# Patient Record
Sex: MALE | Race: BLACK OR AFRICAN AMERICAN | NOT HISPANIC OR LATINO | URBAN - METROPOLITAN AREA
[De-identification: names, ages, dates, MRNs, and addresses within clinical notes are randomized per-mention and may not be internally consistent; named-entity substitution may affect disease eponyms.]

---

## 2019-12-09 ENCOUNTER — OFFICE VISIT (OUTPATIENT)
Dept: FAMILY MEDICINE | Facility: CLINIC | Age: 44
End: 2019-12-09
Payer: COMMERCIAL

## 2019-12-09 VITALS
HEART RATE: 89 BPM | OXYGEN SATURATION: 100 % | WEIGHT: 223 LBS | SYSTOLIC BLOOD PRESSURE: 132 MMHG | BODY MASS INDEX: 32.93 KG/M2 | TEMPERATURE: 97.9 F | DIASTOLIC BLOOD PRESSURE: 78 MMHG

## 2019-12-09 DIAGNOSIS — K62.89 RECTAL IRRITATION: ICD-10-CM

## 2019-12-09 DIAGNOSIS — Z00.00 ROUTINE GENERAL MEDICAL EXAMINATION AT A HEALTH CARE FACILITY: Primary | ICD-10-CM

## 2019-12-09 DIAGNOSIS — N40.0 ENLARGED PROSTATE: ICD-10-CM

## 2019-12-09 DIAGNOSIS — K64.9 HEMORRHOIDS, UNSPECIFIED HEMORRHOID TYPE: ICD-10-CM

## 2019-12-09 DIAGNOSIS — R19.4 FREQUENT BOWEL MOVEMENTS: ICD-10-CM

## 2019-12-09 DIAGNOSIS — N52.8 OTHER MALE ERECTILE DYSFUNCTION: ICD-10-CM

## 2019-12-09 DIAGNOSIS — Z72.0 TOBACCO ABUSE DISORDER: ICD-10-CM

## 2019-12-09 PROCEDURE — 99213 OFFICE O/P EST LOW 20 MIN: CPT | Mod: 25 | Performed by: FAMILY MEDICINE

## 2019-12-09 PROCEDURE — 99386 PREV VISIT NEW AGE 40-64: CPT | Performed by: FAMILY MEDICINE

## 2019-12-09 NOTE — PROGRESS NOTES
3  SUBJECTIVE:   CC: Kayli Johnson is an 44 year old male who presents for preventive health visit.     Healthy Habits:    Do you get at least three servings of calcium containing foods daily (dairy, green leafy vegetables, etc.)? yes    Amount of exercise or daily activities, outside of work: 1 day(s) per week    Problems taking medications regularly not applicable    Medication side effects: No    Have you had an eye exam in the past two years? no    Do you see a dentist twice per year? no    Do you have sleep apnea, excessive snoring or daytime drowsiness?no    Concerns:   1. Prostate: weak stream, frequency       Diagnosed with enalrged prostate on physical and was to follow up  2. BM     Going too frequently    Exercise: starting to get back, job physical  Diet: Been vegan fell off and working back. Cutting meat.  EtoH: 1-2 times a week, 3 drinks  Tobacco use: About 20 yrs about1 PPD  Sexual Activity: with one partner, starting to have problem keeping an erection.  Shots: does not do  Arthralgia: Shoulders, knees possibly work related.    Family History:   Mother: Lung cancer: was smoker,  of Aneurysm   Father: Dementia, Lung Cancer    No history of colon cancer, diabetes or HTN.  No prostate or breast cancer.    Today's PHQ-2 Score:   PHQ-2 (  [employer]) 7/10/2014   Q1: Little interest or pleasure in doing things 0   Q2: Feeling down, depressed or hopeless 0   PHQ-2 Score 0       Abuse: Current or Past(Physical, Sexual or Emotional)- No  Do you feel safe in your environment? Yes      Social History     Tobacco Use     Smoking status: Current Every Day Smoker     Smokeless tobacco: Never Used   Substance Use Topics     Alcohol use: Yes     Comment: weekly     If you drink alcohol do you typically have >3 drinks per day or >7 drinks per week? Yes - AUDIT SCORE:     No flowsheet data found.                      Last PSA: No results found for: PSA    Reviewed orders with patient. Reviewed health  maintenance and updated orders accordingly - Yes  Patient Active Problem List   Diagnosis     Hematospermia     Left foot pain     Obesity     Nail dystrophy     Hypertrophy of prostate with urinary obstruction     Tobacco abuse     No past surgical history on file.    Social History     Tobacco Use     Smoking status: Current Every Day Smoker     Smokeless tobacco: Never Used   Substance Use Topics     Alcohol use: Yes     Comment: weekly     Family History   Problem Relation Age of Onset     Cancer Mother 56        lung     Cancer Father 82        lung     Diabetes Brother      Diabetes Sister      Neurologic Disorder Sister         lupus     Heart Disease Sister          Reviewed and updated as needed this visit by Provider        ROS:  CONSTITUTIONAL: NEGATIVE for fever, chills, change in weight  INTEGUMENTARY/SKIN: NEGATIVE for worrisome rashes, moles or lesions  EYES: NEGATIVE for vision changes or irritation  ENT: NEGATIVE for ear, mouth and throat problems  RESP: NEGATIVE for significant cough or SOB  CV: NEGATIVE for chest pain, palpitations or peripheral edema  GI: NEGATIVE for nausea, abdominal pain, heartburn, or change in bowel habits   male: Positive for decreased urinary stream, erectile dysfunction  MUSCULOSKELETAL: POSITIVE for arthralgias or myalgia  NEURO: NEGATIVE for weakness, dizziness or paresthesias  PSYCHIATRIC: NEGATIVE for changes in mood or affect    OBJECTIVE:   /78   Pulse 89   Temp 97.9  F (36.6  C) (Oral)   Wt 101.2 kg (223 lb)   SpO2 100%   BMI 32.93 kg/m    EXAM:  GENERAL: healthy, alert and no distress  EYES: Eyes grossly normal to inspection, PERRL and conjunctivae and sclerae normal  HENT: ear canals and TM's normal, nose and mouth without ulcers or lesions  NECK: no adenopathy and thyroid normal to palpation  RESP: lungs clear to auscultation - no rales, rhonchi or wheezes  CV: regular rate and rhythm, normal S1 S2, no S3 or S4, no murmur, click or rub, no  "peripheral edema  ABDOMEN: soft, nontender, no masses and bowel sounds normal  RECTAL: hemorrhoid at 8 o'clock, normal sphincter tone, no rectal masses, prostate moderately enlarged in size, smooth, nontender without nodules or masses  MS: no gross musculoskeletal defects noted, no edema  SKIN: no suspicious lesions or rashes  NEURO: Normal strength and tone, mentation intact and speech normal  PSYCH: mentation appears normal, affect normal/bright    Diagnostic Test Results:  Labs reviewed in Epic    ASSESSMENT/PLAN:   Kayli was seen today for prostate problem.    Diagnoses and all orders for this visit:    Routine general medical examination at a health care facility  -     Cancel: GLUCOSE  -     Lipid panel reflex to direct LDL Fasting; Future  -     **Comprehensive metabolic panel FUTURE anytime; Future  -     GLUCOSE; Future    Enlarged prostate  -     Prostate spec antigen screen FUTURE anytime; Future    Hemorrhoids, unspecified hemorrhoid type         -   Likely from frequent bowel motions.  High fiber diet. Will consider cream if persist    Tobacco abuse disorder         -      Discussed risks of smoking and strongly advised smoking cessation.      Frequent bowel movements         -     Etiology unclear, possibly habitual.    Rectal irritation          -    From frequent BMs. Good rectal hygiene    Other orders  -     INFLUENZA VACCINE IM > 6 MONTHS VALENT IIV4 [25304]        COUNSELING:  Reviewed preventive health counseling, as reflected in patient instructions       Regular exercise       Healthy diet/nutrition       Immunizations    Declined: Influenza, Pneumococcal and TDAP due to Concerns about side effects/safety             Prostate cancer screening    Estimated body mass index is 32.93 kg/m  as calculated from the following:    Height as of 7/10/14: 1.753 m (5' 9\").    Weight as of this encounter: 101.2 kg (223 lb).    Weight management plan: Discussed healthy diet and exercise guidelines     " reports that he has been smoking. He has never used smokeless tobacco.  Tobacco Cessation Action Plan: Information offered: Patient not interested at this time    Counseling Resources:  ATP IV Guidelines  Pooled Cohorts Equation Calculator  FRAX Risk Assessment  ICSI Preventive Guidelines  Dietary Guidelines for Americans, 2010  USDA's MyPlate  ASA Prophylaxis  Lung CA Screening    Ld Cavanaugh MD  [facility]

## 2019-12-12 ENCOUNTER — TELEPHONE (OUTPATIENT)
Dept: FAMILY MEDICINE | Facility: CLINIC | Age: 44
End: 2019-12-12

## 2019-12-12 DIAGNOSIS — Z00.00 ROUTINE GENERAL MEDICAL EXAMINATION AT A HEALTH CARE FACILITY: ICD-10-CM

## 2019-12-12 DIAGNOSIS — N40.0 ENLARGED PROSTATE: ICD-10-CM

## 2019-12-12 DIAGNOSIS — N52.9 ERECTILE DYSFUNCTION, UNSPECIFIED ERECTILE DYSFUNCTION TYPE: Primary | ICD-10-CM

## 2019-12-12 LAB
ALBUMIN SERPL-MCNC: 3.9 G/DL (ref 3.4–5)
ALP SERPL-CCNC: 100 U/L (ref 40–150)
ALT SERPL W P-5'-P-CCNC: 74 U/L (ref 0–70)
ANION GAP SERPL CALCULATED.3IONS-SCNC: 2 MMOL/L (ref 3–14)
AST SERPL W P-5'-P-CCNC: 83 U/L (ref 0–45)
BILIRUB SERPL-MCNC: 0.4 MG/DL (ref 0.2–1.3)
BUN SERPL-MCNC: 17 MG/DL (ref 7–30)
CALCIUM SERPL-MCNC: 9.5 MG/DL (ref 8.5–10.1)
CHLORIDE SERPL-SCNC: 108 MMOL/L (ref 94–109)
CHOLEST SERPL-MCNC: 175 MG/DL
CO2 SERPL-SCNC: 30 MMOL/L (ref 20–32)
CREAT SERPL-MCNC: 1.04 MG/DL (ref 0.66–1.25)
GFR SERPL CREATININE-BSD FRML MDRD: 85 ML/MIN/{1.73_M2}
GLUCOSE SERPL-MCNC: 90 MG/DL (ref 70–99)
HDLC SERPL-MCNC: 69 MG/DL
LDLC SERPL CALC-MCNC: 91 MG/DL
NONHDLC SERPL-MCNC: 106 MG/DL
POTASSIUM SERPL-SCNC: 4.5 MMOL/L (ref 3.4–5.3)
PROT SERPL-MCNC: 7.1 G/DL (ref 6.8–8.8)
PSA SERPL-ACNC: 0.58 UG/L (ref 0–4)
SODIUM SERPL-SCNC: 140 MMOL/L (ref 133–144)
TRIGL SERPL-MCNC: 74 MG/DL

## 2019-12-12 PROCEDURE — 36415 COLL VENOUS BLD VENIPUNCTURE: CPT | Performed by: FAMILY MEDICINE

## 2019-12-12 PROCEDURE — 80061 LIPID PANEL: CPT | Performed by: FAMILY MEDICINE

## 2019-12-12 PROCEDURE — G0103 PSA SCREENING: HCPCS | Performed by: FAMILY MEDICINE

## 2019-12-12 PROCEDURE — 80053 COMPREHEN METABOLIC PANEL: CPT | Performed by: FAMILY MEDICINE

## 2019-12-12 NOTE — TELEPHONE ENCOUNTER
Reason for Call: Request for an order or referral:    Order or referral being requested: Lab for testosterone levels    Date needed: as soon as possible    Has the patient been seen by the PCP for this problem? YES    Additional comments: NA    Phone number Patient can be reached at:  Home number on file [phone] (home)    Best Time:  any    Can we leave a detailed message on this number?  YES    Call taken on 12/12/2019 at 7:46 AM by Pamela Garza

## 2019-12-12 NOTE — TELEPHONE ENCOUNTER
Called patient and left detailed VM informing patient of below message. Please help schedule lab only appointment if patient returns call.  Regine JOHNSON CMA ([facility])

## 2019-12-12 NOTE — TELEPHONE ENCOUNTER
Routing to provider to advise. Note that pt had labs done today. Not sure if lab can be added onto labs: Lipids, CMP and PSA.    Regine Cherry RN  [facility]

## 2019-12-12 NOTE — TELEPHONE ENCOUNTER
Lab is unable to add on. Pt will need to return for a redraw. Please notify pt, will need lab only appt. Thanks.    Regine Cherry RN  [facility]

## 2019-12-17 DIAGNOSIS — N52.9 ERECTILE DYSFUNCTION, UNSPECIFIED ERECTILE DYSFUNCTION TYPE: ICD-10-CM

## 2019-12-17 PROCEDURE — 36415 COLL VENOUS BLD VENIPUNCTURE: CPT | Performed by: FAMILY MEDICINE

## 2019-12-17 PROCEDURE — 84403 ASSAY OF TOTAL TESTOSTERONE: CPT | Performed by: FAMILY MEDICINE

## 2019-12-17 PROCEDURE — 84270 ASSAY OF SEX HORMONE GLOBUL: CPT | Performed by: FAMILY MEDICINE

## 2019-12-19 LAB
SHBG SERPL-SCNC: 50 NMOL/L (ref 11–80)
TESTOST FREE SERPL-MCNC: 10.49 NG/DL (ref 4.7–24.4)
TESTOST SERPL-MCNC: 631 NG/DL (ref 240–950)

## 2019-12-19 RX ORDER — SILDENAFIL 100 MG/1
50-100 TABLET, FILM COATED ORAL DAILY PRN
Qty: 12 TABLET | Refills: 3 | Status: SHIPPED | OUTPATIENT
Start: 2019-12-19 | End: 2022-02-22

## 2021-05-07 ENCOUNTER — VIRTUAL VISIT (OUTPATIENT)
Dept: URGENT CARE | Facility: CLINIC | Age: 46
End: 2021-05-07
Payer: COMMERCIAL

## 2021-05-07 DIAGNOSIS — Z20.822 EXPOSURE TO COVID-19 VIRUS: ICD-10-CM

## 2021-05-07 DIAGNOSIS — Z20.822 EXPOSURE TO COVID-19 VIRUS: Primary | ICD-10-CM

## 2021-05-07 LAB
SARS-COV-2 RNA RESP QL NAA+PROBE: NORMAL
SPECIMEN SOURCE: NORMAL

## 2021-05-07 PROCEDURE — 99212 OFFICE O/P EST SF 10 MIN: CPT | Mod: 95

## 2021-05-07 PROCEDURE — U0005 INFEC AGEN DETEC AMPLI PROBE: HCPCS | Performed by: NURSE PRACTITIONER

## 2021-05-07 PROCEDURE — U0003 INFECTIOUS AGENT DETECTION BY NUCLEIC ACID (DNA OR RNA); SEVERE ACUTE RESPIRATORY SYNDROME CORONAVIRUS 2 (SARS-COV-2) (CORONAVIRUS DISEASE [COVID-19]), AMPLIFIED PROBE TECHNIQUE, MAKING USE OF HIGH THROUGHPUT TECHNOLOGIES AS DESCRIBED BY CMS-2020-01-R: HCPCS | Performed by: NURSE PRACTITIONER

## 2021-05-07 NOTE — PATIENT INSTRUCTIONS
"  Patient Education   After Your COVID-19 (Coronavirus) Test  You have been tested for COVID-19 (coronavirus).   If you'll have surgery in the next few days, we'll let you know ahead of time if you have the virus. Please call your surgeon's office with any questions.  For all other patients: Results are usually available in [employer] within 2 to 3 days.   If you do not have a [employer] account, you'll get a letter in the mail in about 7 to 10 days.   [employer] is often the fastest way to get test results. Please sign up if you do not already have a [employer] account. See the handout Getting COVID-19 Test Results in [employer] for help.  What if my test result is positive?  If your test is positive and you have not viewed your result in e[employer], you'll get a phone call with your result. (A positive test means that you have the virus.)     Follow the tips under \"How do I self-isolate?\" below for 10 days (20 days if you have a weak immune system).    You don't need to be retested for COVID-19 before going back to school or work. As long as you're fever-free and feeling better, you can go back to school, work and other activities after waiting the 10 or 20 days.  What if I have questions after I get my results?  If you have questions about your results, please visit our testing website at [email or web address].   After 7 to 10 days, if you have not gotten your results:     Call [phone] ([phone]) and ask to speak with our COVID-19 results team.    If you're being treated at an infusion center: Call your infusion center directly.  What are the symptoms of COVID-19?  Cough, fever and trouble breathing are the most common signs of COVID-19.  Other symptoms can include new headaches, new muscle or body aches, new and unexplained fatigue (feeling very tired), chills, sore throat, congestion (stuffy or runny nose), diarrhea (loose poop), loss of taste or smell, belly pain, and nausea or vomiting " "(feeling sick to your stomach or throwing up).  You may already have symptoms of COVID-19, or they may show up later.  What should I do if I have symptoms?  If you're having surgery: Call your surgeon's office.  For all other patients: Stay home and away from others (self-isolate) until ...    You've had no fever--and no medicine that reduces fever--for 1 full day (24 hours), AND    Other symptoms have gotten better. For example, your cough or breathing has improved, AND    At least 10 days have passed since your symptoms first started.  How do I self-isolate?    Stay in your own room, even for meals. Use your own bathroom if you can.    Stay away from others in your home. No hugging, kissing or shaking hands. No visitors.    Don't go to work, school or anywhere else.    Clean \"high touch\" surfaces often (doorknobs, counters, handles). Use household cleaning spray or wipes. You'll find a full list of  on the EPA website: www.epa.gov/pesticide-registration/list-n-disinfectants-use-against-sars-cov-2.    Cover your mouth and nose with a mask or other face covering to avoid spreading germs.    Wash your hands and face often. Use soap and water.    Caregivers in these groups are at risk for severe illness due to COVID-19:  ? People 65 years and older  ? People who live in a nursing home or long-term care facility  ? People with chronic disease (lung, heart, cancer, diabetes, kidney, liver, immunologic)  ? People who have a weakened immune system, including those who:    Are in cancer treatment    Take medicine that weakens the immune system, such as corticosteroids    Had a bone marrow or organ transplant    Have an immune deficiency    Have poorly controlled HIV or AIDS    Are obese (body mass index of 40 or higher)    Smoke regularly    Caregivers should wear gloves while washing dishes, handling laundry and cleaning bedrooms and bathrooms.    Use caution when washing and drying laundry: Don't shake dirty " laundry and use the warmest water setting that you can.    For more tips on managing your health at home, go to www.cdc.gov/coronavirus/2019-ncov/downloads/10Things.pdf.  How can I take care of myself at home?  1. Get lots of rest. Drink extra fluids (unless a doctor has told you not to).  2. Take Tylenol (acetaminophen) for fever or pain. If you have liver or kidney problems, ask your family doctor if it's OK to take Tylenol.   Adults can take either:  ? 650 mg (two 325 mg pills) every 4 to 6 hours, or   ? 1,000 mg (two 500 mg pills) every 8 hours as needed.  ? Note: Don't take more than 3,000 mg in one day. Acetaminophen is found in many medicines (both prescribed and over-the-counter medicines). Read all labels to be sure you don't take too much.   For children, check the Tylenol bottle for the right dose. The dose is based on the child's age or weight.  3. If you have other health problems (like cancer, heart failure, an organ transplant or severe kidney disease): Call your specialty clinic if you don't feel better in the next 2 days.  4. Know when to call 911. Emergency warning signs include:  ? Trouble breathing or shortness of breath  ? Chest pain or pressure that doesn't go away  ? Feeling confused like you haven't felt before, or not being able to wake up  ? Bluish-colored lips or face  5. If your doctor prescribed a blood thinner medicine: Follow their instructions.  Where can I get more information?    [facility] - About COVID-19:   [email or web address]    CDC - If You're Sick: cdc.gov/coronavirus/2019-ncov/about/steps-when-sick.html    CDC - Ending Home Isolation: www.cdc.gov/coronavirus/2019-ncov/hcp/disposition-in-home-patients.html    CDC - Caring for Someone: www.cdc.gov/coronavirus/2019-ncov/if-you-are-sick/care-for-someone.html    [facility] - Interim Guidance for Hospital Discharge to Home: www.health.[facility].[place]/diseases/coronavirus/hcp/hospdischarge.pdf    [facility]  "clinical trials (COVID-19 research studies): clinicalaffairs.[facility]-clinical-trials    Below are the COVID-19 hotlines at the [place] Department of Health ([facility]). Interpreters are available.  ? For health questions: Call 621-917-7140 or 1-835.491.5920 (7 a.m. to 7 p.m.)  ? For questions about schools and childcare: Call 456-338-9690 or 1-930.767.3708 (7 a.m. to 7 p.m.)    For informational purposes only. Not to replace the advice of your health care provider. Clinically reviewed by Infection Prevention and the [facility] COVID-19 Clinical Team. Copyright   2020 [employer]. All rights reserved. [employer] 355325 - Rev 11/11/20.       Patient Education   After Your COVID-19 (Coronavirus) Test  You have been tested for COVID-19 (coronavirus).   If you'll have surgery in the next few days, we'll let you know ahead of time if you have the virus. Please call your surgeon's office with any questions.  For all other patients: Results are usually available in [employer] within 2 to 3 days.   If you do not have a [employer] account, you'll get a letter in the mail in about 7 to 10 days.   [employer] is often the fastest way to get test results. Please sign up if you do not already have a [employer] account. See the handout Getting COVID-19 Test Results in [employer] for help.  What if my test result is positive?  If your test is positive and you have not viewed your result in [employer], you'll get a phone call with your result. (A positive test means that you have the virus.)     Follow the tips under \"How do I self-isolate?\" below for 10 days (20 days if you have a weak immune system).    You don't need to be retested for COVID-19 before going back to school or work. As long as you're fever-free and feeling better, you can go back to school, work and other activities after waiting the 10 or 20 days.  What if I have questions after I get my results?  If you have questions about your results, please visit our testing " "website at [email or web address].   After 7 to 10 days, if you have not gotten your results:     Call [phone] ([phone]) and ask to speak with our COVID-19 results team.    If you're being treated at an infusion center: Call your infusion center directly.  What are the symptoms of COVID-19?  Cough, fever and trouble breathing are the most common signs of COVID-19.  Other symptoms can include new headaches, new muscle or body aches, new and unexplained fatigue (feeling very tired), chills, sore throat, congestion (stuffy or runny nose), diarrhea (loose poop), loss of taste or smell, belly pain, and nausea or vomiting (feeling sick to your stomach or throwing up).  You may already have symptoms of COVID-19, or they may show up later.  What should I do if I have symptoms?  If you're having surgery: Call your surgeon's office.  For all other patients: Stay home and away from others (self-isolate) until ...    You've had no fever--and no medicine that reduces fever--for 1 full day (24 hours), AND    Other symptoms have gotten better. For example, your cough or breathing has improved, AND    At least 10 days have passed since your symptoms first started.  How do I self-isolate?    Stay in your own room, even for meals. Use your own bathroom if you can.    Stay away from others in your home. No hugging, kissing or shaking hands. No visitors.    Don't go to work, school or anywhere else.    Clean \"high touch\" surfaces often (doorknobs, counters, handles). Use household cleaning spray or wipes. You'll find a full list of  on the EPA website: www.epa.gov/pesticide-registration/list-n-disinfectants-use-against-sars-cov-2.    Cover your mouth and nose with a mask or other face covering to avoid spreading germs.    Wash your hands and face often. Use soap and water.    Caregivers in these groups are at risk for severe illness due to COVID-19:  ? People 65 years and " older  ? People who live in a nursing home or long-term care facility  ? People with chronic disease (lung, heart, cancer, diabetes, kidney, liver, immunologic)  ? People who have a weakened immune system, including those who:    Are in cancer treatment    Take medicine that weakens the immune system, such as corticosteroids    Had a bone marrow or organ transplant    Have an immune deficiency    Have poorly controlled HIV or AIDS    Are obese (body mass index of 40 or higher)    Smoke regularly    Caregivers should wear gloves while washing dishes, handling laundry and cleaning bedrooms and bathrooms.    Use caution when washing and drying laundry: Don't shake dirty laundry and use the warmest water setting that you can.    For more tips on managing your health at home, go to www.cdc.gov/coronavirus/2019-ncov/downloads/10Things.pdf.  How can I take care of myself at home?  6. Get lots of rest. Drink extra fluids (unless a doctor has told you not to).  7. Take Tylenol (acetaminophen) for fever or pain. If you have liver or kidney problems, ask your family doctor if it's OK to take Tylenol.   Adults can take either:  ? 650 mg (two 325 mg pills) every 4 to 6 hours, or   ? 1,000 mg (two 500 mg pills) every 8 hours as needed.  ? Note: Don't take more than 3,000 mg in one day. Acetaminophen is found in many medicines (both prescribed and over-the-counter medicines). Read all labels to be sure you don't take too much.   For children, check the Tylenol bottle for the right dose. The dose is based on the child's age or weight.  8. If you have other health problems (like cancer, heart failure, an organ transplant or severe kidney disease): Call your specialty clinic if you don't feel better in the next 2 days.  9. Know when to call 911. Emergency warning signs include:  ? Trouble breathing or shortness of breath  ? Chest pain or pressure that doesn't go away  ? Feeling confused like you haven't felt before, or not being able  to wake up  ? Bluish-colored lips or face  10. If your doctor prescribed a blood thinner medicine: Follow their instructions.  Where can I get more information?    [facility] - About COVID-19:   [email or web address]    CDC - If You're Sick: cdc.gov/coronavirus/2019-ncov/about/steps-when-sick.html    CDC - Ending Home Isolation: www.cdc.gov/coronavirus/2019-ncov/hcp/disposition-in-home-patients.html    [facility] - Caring for Someone: www.cdc.gov/coronavirus/2019-ncov/if-you-are-sick/care-for-someone.html    [facility] - Interim Guidance for Hospital Discharge to Home: www.[facility].[place]/diseases/coronavirus/hcp/hospdischarge.pdf    [facility] clinical trials (COVID-19 research studies): clinicalaffairs.[facility]-clinical-trials    Below are the COVID-19 hotlines at the [place] Department of Health ([facility]). Interpreters are available.  ? For health questions: Call 978-076-7728 or 1-453.433.6153 (7 a.m. to 7 p.m.)  ? For questions about schools and childcare: Call 971-617-8853 or 1-540.104.3535 (7 a.m. to 7 p.m.)    For informational purposes only. Not to replace the advice of your health care provider. Clinically reviewed by Infection Prevention and the [facility] COVID-19 Clinical Team. Copyright   2020 [employer]. All rights reserved. "[employer] 049623 - Rev 11/11/20.

## 2021-05-07 NOTE — PROGRESS NOTES
Kayli is a 46 year old who is being evaluated via a billable telephone visit.        Assessment & Plan   Problem List Items Addressed This Visit     None      Visit Diagnoses     Exposure to COVID-19 virus    -  Primary    Relevant Orders    Asymptomatic COVID-19 Virus (Coronavirus) by PCR           COVID test ordered for asymptomatic exposure to COVID, scheduling number given to patient to schedule curbside COVID test. Questions answered.     Yolande Toribio NP  [facility]          Subjective   Kayli is a 46 year old who presents for the following health issues     Patient's 17-year-old daughter tested positive on 4/30/21 and he lives with her and her symptoms started on 4/27. He is asymptomatic but would like testing for COVID. He is immunocompetent. Denies cough, fever, shortness of breath or any other symptoms.         Objective       Vitals:  No vitals were obtained today due to virtual visit.    Physical Exam   alert and no distress  PSYCH: Alert and oriented times 3; coherent speech, normal   rate and volume, able to articulate logical thoughts, able   to abstract reason, no tangential thoughts, no hallucinations   or delusions  His affect is normal  RESP: No cough, no audible wheezing, able to talk in full sentences  Remainder of exam unable to be completed due to telephone visits      Phone call duration: 11 minutes

## 2021-05-08 LAB
LABORATORY COMMENT REPORT: NORMAL
SARS-COV-2 RNA RESP QL NAA+PROBE: NEGATIVE
SPECIMEN SOURCE: NORMAL

## 2021-06-06 ENCOUNTER — HEALTH MAINTENANCE LETTER (OUTPATIENT)
Age: 46
End: 2021-06-06

## 2021-07-29 ENCOUNTER — VIRTUAL VISIT (OUTPATIENT)
Dept: FAMILY MEDICINE | Facility: CLINIC | Age: 46
End: 2021-07-29
Payer: COMMERCIAL

## 2021-07-29 DIAGNOSIS — Z53.9 ERRONEOUS ENCOUNTER--DISREGARD: Primary | ICD-10-CM

## 2021-07-29 NOTE — PROGRESS NOTES
Kayli is a 46 year old who is being evaluated via a billable telephone visit.      What phone number would you like to be contacted at? [phone]  How would you like to obtain your AVS? Lizett    Called pt, he decided to to have this appt      This encounter was opened in error. Please disregard.

## 2021-09-26 ENCOUNTER — HEALTH MAINTENANCE LETTER (OUTPATIENT)
Age: 46
End: 2021-09-26

## 2022-02-21 NOTE — PROGRESS NOTES
Assessment & Plan       Chronic pain of both knees  I recommended we check x-rays of the knees.  I personally reviewed the images which show some mild degenerative changes, but no fractures.  The formal radiology report is pending.  He would likely benefit from physical therapy and weight loss.  He may need an MRI to evaluate symptoms further since he has some catching and locking symptoms, especially in the left.  - Physical Therapy Referral; Future  - XR Knee Standing Bilateral 3 Views; Future    Chronic pain of both shoulders  I recommended we check x-rays of the shoulders.  There is some evidence of degenerative changes bilaterally.  He would likely benefit from physical therapy.  If this does not help resolve symptoms he will let me know and we can pursue further treatment options.  - Physical Therapy Referral; Future  - XR Shoulder Right G/E 3 Views; Future  - XR Shoulder Left G/E 3 Views; Future    Tobacco abuse  Smoking cessation and I encouraged him to quit.  He was given a prescription for nicotine gum which has been helpful for him in the past.  - nicotine (NICORETTE) 2 MG gum; Place 1 each (2 mg) inside cheek every hour as needed for smoking cessation    Class 2 obesity due to excess calories without serious comorbidity with body mass index (BMI) of 36.0 to 36.9 in adult  He is going to work on lifestyle modifications help with weight loss.    Elevated liver enzymes  In 2019 his liver enzymes were elevated.  We are going to recheck a CMP and I added an acute hepatitis panel.  He reports being diagnosed with hepatitis A when he was young.  - Comprehensive metabolic panel (BMP + Alb, Alk Phos, ALT, AST, Total. Bili, TP); Future  - Acute hepatitis panel; Future  - Comprehensive metabolic panel (BMP + Alb, Alk Phos, ALT, AST, Total. Bili, TP)  - Acute hepatitis panel    Erectile dysfunction, unspecified erectile dysfunction type  Testosterone levels were normal 2019.  He was given a refill of Viagra.  -  "sildenafil (VIAGRA) 100 MG tablet; Take 0.5-1 tablets ( mg) by mouth daily as needed (ED)    Benign prostatic hyperplasia with weak urinary stream  He may monitor fluids in the evening to prevent nocturia symptoms.    Screening for colon cancer  - Adult Gastro Ref - Procedure Only; Future    Screening cholesterol level  - Lipid Profile (Chol, Trig, HDL, LDL calc); Future  - Lipid Profile (Chol, Trig, HDL, LDL calc)    Screening for prostate cancer  - PSA, screen; Future  - PSA, screen      40 minutes spent on the date of the encounter doing chart review, review of test results, interpretation of tests, patient visit and documentation            BMI:   Estimated body mass index is 36.58 kg/m  as calculated from the following:    Height as of this encounter: 1.753 m (5' 9\").    Weight as of this encounter: 112.4 kg (247 lb 11.2 oz).   Weight management plan: Discussed healthy diet and exercise guidelines        Return in about 1 year (around 2/22/2023) for Annual Exam.    Seymour Martínez DO  [facility]    Asa Milan is a 46 year old who presents for the following health issues     History of Present Illness     Reason for visit:  Knee and shoulder pain  Symptom onset:  More than a month  Symptom intensity:  Moderate  Symptom progression:  Staying the same  Had these symptoms before:  Yes  Has tried/received treatment for these symptoms:  No  What makes it worse:  Twisting knee, sleeping with the shoulders  What makes it better:  Rest    He eats 2-3 servings of fruits and vegetables daily.He consumes 1 sweetened beverage(s) daily.He exercises with enough effort to increase his heart rate 9 or less minutes per day.  He exercises with enough effort to increase his heart rate 3 or less days per week.   He is taking medications regularly.       Pain History:  When did you first notice your pain? Shoulder its been years. Knee for the last year  Have you seen anyone else for your " "pain? No  Where in your body do you have pain? Musculoskeletal problem/pain  Onset/Duration: Couple years  Description  Location: knee and Shoulder - bilateral  Joint Swelling: YES right knee  Redness: YES  Pain: YES for the shoulder when sleeping  Warmth: no  Intensity:  moderate  Progression of Symptoms:  worsening  Accompanying signs and symptoms:   Fevers: no  Numbness/tingling/weakness: no  History  Trauma to the area: no  Recent illness:  no  Previous similar problem: no  Previous evaluation:  no  Precipitating or alleviating factors:  Aggravating factors include: climbing stairs, lifting and overuse  Therapies tried and outcome: nothing          Review of Systems         Objective    /89   Pulse 96   Temp 97.1  F (36.2  C)   Ht 1.753 m (5' 9\")   Wt 112.4 kg (247 lb 11.2 oz)   SpO2 100%   BMI 36.58 kg/m    Body mass index is 36.58 kg/m .  Physical Exam   GENERAL: healthy, alert and no distress  EYES: Eyes grossly normal to inspection, PERRL and conjunctivae and sclerae normal  HENT: ear canals and TM's normal, nose and mouth without ulcers or lesions  NECK: no adenopathy, no asymmetry, masses, or scars and thyroid normal to palpation  RESP: lungs clear to auscultation - no rales, rhonchi or wheezes  CV: regular rate and rhythm, normal S1 S2, no S3 or S4, no murmur, click or rub, no peripheral edema and peripheral pulses strong  ABDOMEN: soft, nontender, no hepatosplenomegaly, no masses and bowel sounds normal  MS: There is some mild crepitation with flexion extension and extension of both knees.  Rahul's, varus, valgus, Lachman's and drawer testing negative bilaterally.  There is decreased active and passive range of motion of both shoulders, worse on the right.  Scaption strength is 5 out of 5 bilaterally.  Internal and external rotation strength is normal bilaterally.  Varghese testing negative bilaterally.  SKIN: no suspicious lesions or rashes  NEURO: Normal strength and tone, mentation " intact and speech normal  PSYCH: mentation appears normal, affect normal/bright

## 2022-02-22 ENCOUNTER — OFFICE VISIT (OUTPATIENT)
Dept: FAMILY MEDICINE | Facility: CLINIC | Age: 47
End: 2022-02-22
Payer: COMMERCIAL

## 2022-02-22 ENCOUNTER — ANCILLARY PROCEDURE (OUTPATIENT)
Dept: GENERAL RADIOLOGY | Facility: CLINIC | Age: 47
End: 2022-02-22
Attending: FAMILY MEDICINE
Payer: COMMERCIAL

## 2022-02-22 ENCOUNTER — TELEPHONE (OUTPATIENT)
Dept: FAMILY MEDICINE | Facility: CLINIC | Age: 47
End: 2022-02-22

## 2022-02-22 VITALS
DIASTOLIC BLOOD PRESSURE: 89 MMHG | HEART RATE: 96 BPM | SYSTOLIC BLOOD PRESSURE: 137 MMHG | WEIGHT: 247.7 LBS | HEIGHT: 69 IN | OXYGEN SATURATION: 100 % | BODY MASS INDEX: 36.69 KG/M2 | TEMPERATURE: 97.1 F

## 2022-02-22 DIAGNOSIS — G89.29 CHRONIC PAIN OF BOTH SHOULDERS: ICD-10-CM

## 2022-02-22 DIAGNOSIS — M25.511 CHRONIC PAIN OF BOTH SHOULDERS: ICD-10-CM

## 2022-02-22 DIAGNOSIS — M25.512 CHRONIC PAIN OF BOTH SHOULDERS: ICD-10-CM

## 2022-02-22 DIAGNOSIS — E66.09 CLASS 2 OBESITY DUE TO EXCESS CALORIES WITHOUT SERIOUS COMORBIDITY WITH BODY MASS INDEX (BMI) OF 36.0 TO 36.9 IN ADULT: ICD-10-CM

## 2022-02-22 DIAGNOSIS — R39.12 BENIGN PROSTATIC HYPERPLASIA WITH WEAK URINARY STREAM: ICD-10-CM

## 2022-02-22 DIAGNOSIS — N40.1 BENIGN PROSTATIC HYPERPLASIA WITH WEAK URINARY STREAM: ICD-10-CM

## 2022-02-22 DIAGNOSIS — M25.561 CHRONIC PAIN OF BOTH KNEES: ICD-10-CM

## 2022-02-22 DIAGNOSIS — Z13.220 SCREENING CHOLESTEROL LEVEL: ICD-10-CM

## 2022-02-22 DIAGNOSIS — M25.562 CHRONIC PAIN OF BOTH KNEES: ICD-10-CM

## 2022-02-22 DIAGNOSIS — N52.9 ERECTILE DYSFUNCTION, UNSPECIFIED ERECTILE DYSFUNCTION TYPE: ICD-10-CM

## 2022-02-22 DIAGNOSIS — G89.29 CHRONIC PAIN OF BOTH KNEES: ICD-10-CM

## 2022-02-22 DIAGNOSIS — Z72.0 TOBACCO ABUSE: ICD-10-CM

## 2022-02-22 DIAGNOSIS — M25.561 CHRONIC PAIN OF BOTH KNEES: Primary | ICD-10-CM

## 2022-02-22 DIAGNOSIS — Z12.5 SCREENING FOR PROSTATE CANCER: ICD-10-CM

## 2022-02-22 DIAGNOSIS — G89.29 CHRONIC PAIN OF BOTH KNEES: Primary | ICD-10-CM

## 2022-02-22 DIAGNOSIS — E66.812 CLASS 2 OBESITY DUE TO EXCESS CALORIES WITHOUT SERIOUS COMORBIDITY WITH BODY MASS INDEX (BMI) OF 36.0 TO 36.9 IN ADULT: ICD-10-CM

## 2022-02-22 DIAGNOSIS — R74.8 ELEVATED LIVER ENZYMES: ICD-10-CM

## 2022-02-22 DIAGNOSIS — Z12.11 SCREENING FOR COLON CANCER: ICD-10-CM

## 2022-02-22 DIAGNOSIS — M25.562 CHRONIC PAIN OF BOTH KNEES: Primary | ICD-10-CM

## 2022-02-22 LAB
ALBUMIN SERPL-MCNC: 3.9 G/DL (ref 3.4–5)
ALP SERPL-CCNC: 106 U/L (ref 40–150)
ALT SERPL W P-5'-P-CCNC: 32 U/L (ref 0–70)
ANION GAP SERPL CALCULATED.3IONS-SCNC: 7 MMOL/L (ref 3–14)
AST SERPL W P-5'-P-CCNC: 16 U/L (ref 0–45)
BILIRUB SERPL-MCNC: 0.4 MG/DL (ref 0.2–1.3)
BUN SERPL-MCNC: 17 MG/DL (ref 7–30)
CALCIUM SERPL-MCNC: 9 MG/DL (ref 8.5–10.1)
CHLORIDE BLD-SCNC: 108 MMOL/L (ref 94–109)
CHOLEST SERPL-MCNC: 198 MG/DL
CO2 SERPL-SCNC: 24 MMOL/L (ref 20–32)
CREAT SERPL-MCNC: 1.04 MG/DL (ref 0.66–1.25)
FASTING STATUS PATIENT QL REPORTED: YES
GFR SERPL CREATININE-BSD FRML MDRD: 89 ML/MIN/1.73M2
GLUCOSE BLD-MCNC: 97 MG/DL (ref 70–99)
HDLC SERPL-MCNC: 61 MG/DL
LDLC SERPL CALC-MCNC: 109 MG/DL
NONHDLC SERPL-MCNC: 137 MG/DL
POTASSIUM BLD-SCNC: 4 MMOL/L (ref 3.4–5.3)
PROT SERPL-MCNC: 7.5 G/DL (ref 6.8–8.8)
PSA SERPL-MCNC: 0.39 UG/L (ref 0–4)
SODIUM SERPL-SCNC: 139 MMOL/L (ref 133–144)
TRIGL SERPL-MCNC: 142 MG/DL

## 2022-02-22 PROCEDURE — G0103 PSA SCREENING: HCPCS | Performed by: FAMILY MEDICINE

## 2022-02-22 PROCEDURE — 80053 COMPREHEN METABOLIC PANEL: CPT | Performed by: FAMILY MEDICINE

## 2022-02-22 PROCEDURE — 80061 LIPID PANEL: CPT | Performed by: FAMILY MEDICINE

## 2022-02-22 PROCEDURE — 99215 OFFICE O/P EST HI 40 MIN: CPT | Performed by: FAMILY MEDICINE

## 2022-02-22 PROCEDURE — 73562 X-RAY EXAM OF KNEE 3: CPT | Mod: FY | Performed by: RADIOLOGY

## 2022-02-22 PROCEDURE — 73030 X-RAY EXAM OF SHOULDER: CPT | Mod: RT | Performed by: RADIOLOGY

## 2022-02-22 PROCEDURE — 80074 ACUTE HEPATITIS PANEL: CPT | Performed by: FAMILY MEDICINE

## 2022-02-22 PROCEDURE — 36415 COLL VENOUS BLD VENIPUNCTURE: CPT | Performed by: FAMILY MEDICINE

## 2022-02-22 PROCEDURE — 73030 X-RAY EXAM OF SHOULDER: CPT | Mod: LT | Performed by: RADIOLOGY

## 2022-02-22 RX ORDER — SILDENAFIL 100 MG/1
50-100 TABLET, FILM COATED ORAL DAILY PRN
Qty: 12 TABLET | Refills: 3 | Status: SHIPPED | OUTPATIENT
Start: 2022-02-22

## 2022-02-22 NOTE — TELEPHONE ENCOUNTER
DD,   Called [facility] radiology  MSK films go to [facility] they said   Called [facility] at [phone]  Radiologist will re-read and addend   Faxed report and what needs to be fixed to [phone]  Laly APODACA RN

## 2022-02-22 NOTE — TELEPHONE ENCOUNTER
----- Message from Seymour Martínez DO sent at 2/22/2022  9:20 AM CST -----  Please contact the radiology department to ask the radiologist to addend the knee x-ray note since the report mistakenly lists the right knee twice.  Thank you, DE

## 2022-02-23 ENCOUNTER — TELEPHONE (OUTPATIENT)
Dept: GASTROENTEROLOGY | Facility: CLINIC | Age: 47
End: 2022-02-23
Payer: COMMERCIAL

## 2022-02-23 LAB
HAV IGM SERPL QL IA: NEGATIVE
HBV CORE IGM SERPL QL IA: NEGATIVE
HBV SURFACE AG SERPL QL IA: NONREACTIVE
HCV AB SERPL QL IA: NEGATIVE

## 2022-07-03 ENCOUNTER — HEALTH MAINTENANCE LETTER (OUTPATIENT)
Age: 47
End: 2022-07-03

## 2023-02-27 NOTE — PROGRESS NOTES
ASSESSMENT & PLAN    Kayli was seen today for pain, pain and pain.    Diagnoses and all orders for this visit:    Chronic pain of both knees  -     MR Knee Left w/o Contrast; Future    Chronic pain of both shoulders  -     MR Shoulder Right w/o Contrast; Future  -     MR Shoulder Left w/o Contrast; Future    Glenohumeral arthritis, right  -     MR Shoulder Right w/o Contrast; Future    Hand pain, left        Reviewed myriad issues.  He desires imaging next, though we discussed additional considerations physical therapy, injections, also anti-inflammatory medications, and compression/bracing as indicated.  Await scans, then follow-up in clinic for review.  Questions answered. Discussed signs and symptoms that may indicate more serious issues; the patient was instructed to seek appropriate care if noted. Kayli indicates understanding of these issues and agrees with the plan.      See Patient Instructions  Patient Instructions   We discussed bilateral shoulder, bilateral knee, and bilateral hand/thumb pain.  In the right shoulder, previous x-rays demonstrated underlying glenohumeral arthritis.  Could also be some rotator cuff component, though given the arthritic findings, that typically becomes her focus.  On the left, some AC joint arthrosis, but otherwise no significant shoulder joint arthritis.  In the knees, mild underlying degenerative change noted on previous x-rays.  In the hands, also suspect some MCP joint arthrosis of the thumbs,, possibly first CMC joint as well.    For next steps, we discussed considerations around imaging, rehab, support, use medications, injections.  Following discussion, can obtain MRI of the bilateral shoulders.  Can also obtain MRI of the left knee.  Discussed likely referral to physical therapy as well, but await imaging.    Return to clinic 2 to 3 days after the MRI scans are obtained for review of imaging results and to discuss next steps.    Advanced imaging is done by  appointment. Please call [facility] ([facility]/[place]) [phone]  or [facility] ([facility]/[place]) [phone]  to schedule your MRI scans.     Some insurance companies may require a prior authorization to be completed which can delay the time until you are able to schedule your appointment.       If you are active on [employer], you may have access to your test results before your provider is able to review the study and advise on next steps.      Please make a follow up appointment in the clinic at least 2 days following your MRI scans by calling [phone].      If you have any further questions for your physician or physician s care team you can contact them thru [employer] or by calling  [phone] and use option 3 to leave a voice message.   Messages received during business hours will be returned same day.          [facility]      CC: Dr Laurie Oglesby    -----  Chief Complaint   Patient presents with     Left Knee - Pain     Left Shoulder - Pain     Right Shoulder - Pain       SUBJECTIVE  Kayli Johnson is a/an 47 year old male who is in consultation as a referral from Dr Laurie Oglesby for evaluation of bilateral shoulder pain and left knee pain.     Also notes bilateral knee pain, lower back and bilateral wrist, bilateral thumb pain.     The patient is seen by themselves.  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  Location of Pain: left anterior knee, right posterior and deep shoulder pain and deep left shoulder pain.  Worsened by: shoulders: sleeping. Has to sleep on his back. Knee: Pivoting.  Better with: nothing  Treatments tried: No treatments tried to date. Is interested in physical therapy and wishes to discuss conservative care  Associated symptoms: Knee: instability with stepping and a constant  pain. 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  Right shoulder:  Gets some pain posteriorly at times. Has questions about rotator cuff.  Bilateral shoulder popping sensation.  Difficult to lie on side for sleep.    **  Knee pain:    Left knee:  Increasing with time. Can limp if on feet more. Sharp pain at times. Pain constant.    Right knee pain flares at times, intermittent, improves with rest. Not constant like left knee.    Some knee swelling.  Some left knee joint noise.    **    Thumbs:  Pain around base of thumbs, notes some prominence at radial thumb MCP joint. Has history of bilateral thumb sprains.        REVIEW OF SYSTEMS:  Review of Systems      OBJECTIVE:  /84   Wt 112.4 kg (247 lb 11.2 oz)   BMI 36.58 kg/m         Bilateral Shoulder exam    ROM:      forward flexion ~150-160, pain R > L        abduction grossly symmetric, ~120-130, some pain R > L       internal rotation right low lumbar, left mid thoracic       external rotation lacking 10-15 deg right compared to left    Strength:      abduction 5/5       internal rotation 5/5       external rotation 4+/5    Impingement testing:      positive (+) Varghese       positive (+) empty can right    Skin:      no visible deformities       well perfused       capillary refill brisk    Sensation:      normal sensation over shoulder and upper extremity         Hand/wrist (bilateral):    Inspection:  Visible prominence right radial MCP joint of thumb compared to left.  No other swelling. No ecchymosis.    Motion:  Digit motion some discomfort thumb MCP joint with motion    Radial pulses normal, +2/4, capillary refill brisk.    Palpation:  Tender mild first CMC joint area    Thumb grind pos right, neg left      Bilateral Knee exam    Inspection:   no effusion   no ecchymosis    ROM:      Grossly symmetric ROM with flexion  and extension    Tender: mild lateral joint line left    Non Tender:       remainder of knee area    Special Tests:      neg (-) Rahul       neg (-) Lachman       neg (-) anterior drawer       neg (-) posterior drawer       neg (-) varus       neg (-) valgus       Mild bilat pain with forced extension            RADIOLOGY:  I independently visualized and reviewed these images with the patient  Shoulders with AC arthrosis present, and GH arthrosis right. No acute bony abnormality.  Knee with mild degenerative change, suspect intra-articular body.      XR SHOULDER RIGHT G/E 3 VIEWS  2/22/2022 8:47 AM      HISTORY: Chronic pain of both shoulders     COMPARISON: None                                                                      IMPRESSION: No acute fracture or malalignment. Moderate glenohumeral  and acromioclavicular joint degenerative changes.      KAMERON VARGAS MD       XR SHOULDER LEFT G/E 3 VIEWS  2/22/2022 8:47 AM      HISTORY: Chronic pain of both shoulders     COMPARISON: None                                                                      IMPRESSION: No acute fracture or malalignment. There is normal  glenohumeral joint spacing. Moderate to severe acromioclavicular joint  degenerative changes. Punctate ossicle near the superior glenoid, may  be intra-articular.     KAMERON VARGAS MD       XR KNEE STANDING BILATERAL 3 VIEWS  2/22/2022 8:47 AM      HISTORY: Chronic pain of both knees     COMPARISON: None     IMPRESSION:     Right knee: No acute fracture or malalignment. There is normal joint  spacing. Minimal marginal bony spurring in the lateral and  patellofemoral compartments. There is a 3 cm chronic ossicle adjacent  to the lateral patella. There is a 1 cm intra-articular body  posteriorly. No significant knee joint effusion.     ((Left knee)): No acute fracture or malalignment. Mild medial  compartment joint space narrowing with a subchondral cyst. Minimal  patellofemoral compartment  degenerative changes. Small intra-articular  bodies posteriorly. No significant knee joint effusion.     KAMERON VARGAS MD

## 2023-02-28 ENCOUNTER — OFFICE VISIT (OUTPATIENT)
Dept: ORTHOPEDICS | Facility: CLINIC | Age: 48
End: 2023-02-28
Payer: COMMERCIAL

## 2023-02-28 VITALS — BODY MASS INDEX: 36.58 KG/M2 | WEIGHT: 247.7 LBS | DIASTOLIC BLOOD PRESSURE: 84 MMHG | SYSTOLIC BLOOD PRESSURE: 122 MMHG

## 2023-02-28 DIAGNOSIS — M79.642 HAND PAIN, LEFT: ICD-10-CM

## 2023-02-28 DIAGNOSIS — M25.512 CHRONIC PAIN OF BOTH SHOULDERS: ICD-10-CM

## 2023-02-28 DIAGNOSIS — M25.511 CHRONIC PAIN OF BOTH SHOULDERS: ICD-10-CM

## 2023-02-28 DIAGNOSIS — M25.561 CHRONIC PAIN OF BOTH KNEES: Primary | ICD-10-CM

## 2023-02-28 DIAGNOSIS — M19.011 GLENOHUMERAL ARTHRITIS, RIGHT: ICD-10-CM

## 2023-02-28 DIAGNOSIS — M25.562 CHRONIC PAIN OF BOTH KNEES: Primary | ICD-10-CM

## 2023-02-28 DIAGNOSIS — G89.29 CHRONIC PAIN OF BOTH SHOULDERS: ICD-10-CM

## 2023-02-28 DIAGNOSIS — G89.29 CHRONIC PAIN OF BOTH KNEES: Primary | ICD-10-CM

## 2023-02-28 PROCEDURE — 99244 OFF/OP CNSLTJ NEW/EST MOD 40: CPT | Performed by: PEDIATRICS

## 2023-02-28 ASSESSMENT — PAIN SCALES - GENERAL: PAINLEVEL: NO PAIN (0)

## 2023-02-28 NOTE — PATIENT INSTRUCTIONS
We discussed bilateral shoulder, bilateral knee, and bilateral hand/thumb pain.  In the right shoulder, previous x-rays demonstrated underlying glenohumeral arthritis.  Could also be some rotator cuff component, though given the arthritic findings, that typically becomes her focus.  On the left, some AC joint arthrosis, but otherwise no significant shoulder joint arthritis.  In the knees, mild underlying degenerative change noted on previous x-rays.  In the hands, also suspect some MCP joint arthrosis of the thumbs,, possibly first CMC joint as well.    For next steps, we discussed considerations around imaging, rehab, support, use medications, injections.  Following discussion, can obtain MRI of the bilateral shoulders.  Can also obtain MRI of the left knee.  Discussed likely referral to physical therapy as well, but await imaging.    Return to clinic 2 to 3 days after the MRI scans are obtained for review of imaging results and to discuss next steps.    Advanced imaging is done by appointment. Please call [facility] ([facility]/[place]) [phone]  or [facility] ([facility]/[place]) [phone]  to schedule your MRI scans.     Some insurance companies may require a prior authorization to be completed which can delay the time until you are able to schedule your appointment.       If you are active on [employer], you may have access to your test results before your provider is able to review the study and advise on next steps.      Please make a follow up appointment in the clinic at least 2 days following your MRI scans by calling [phone].      If you have any further questions for your physician or physician s care team you can contact them thru [employer] or by calling  [phone] and use option 3 to leave a voice message.   Messages received during business hours will be returned same day.

## 2023-04-23 ENCOUNTER — HEALTH MAINTENANCE LETTER (OUTPATIENT)
Age: 48
End: 2023-04-23

## 2023-07-16 ENCOUNTER — HEALTH MAINTENANCE LETTER (OUTPATIENT)
Age: 48
End: 2023-07-16

## 2024-09-08 ENCOUNTER — HEALTH MAINTENANCE LETTER (OUTPATIENT)
Age: 49
End: 2024-09-08